# Patient Record
Sex: MALE | Race: WHITE | URBAN - METROPOLITAN AREA
[De-identification: names, ages, dates, MRNs, and addresses within clinical notes are randomized per-mention and may not be internally consistent; named-entity substitution may affect disease eponyms.]

---
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
## 2025-05-03 ENCOUNTER — OFFICE VISIT (OUTPATIENT)
Age: 20
End: 2025-05-03

## 2025-05-03 VITALS
HEART RATE: 69 BPM | RESPIRATION RATE: 18 BRPM | SYSTOLIC BLOOD PRESSURE: 97 MMHG | HEIGHT: 73 IN | BODY MASS INDEX: 21.47 KG/M2 | TEMPERATURE: 98.2 F | OXYGEN SATURATION: 100 % | WEIGHT: 162 LBS | DIASTOLIC BLOOD PRESSURE: 66 MMHG

## 2025-05-03 DIAGNOSIS — R00.1 SINUS BRADYCARDIA: Primary | ICD-10-CM

## 2025-05-03 ASSESSMENT — ENCOUNTER SYMPTOMS
RESPIRATORY NEGATIVE: 1
SORE THROAT: 0
EYES NEGATIVE: 1
ABDOMINAL PAIN: 0
RHINORRHEA: 0
ALLERGIC/IMMUNOLOGIC NEGATIVE: 1

## 2025-05-03 NOTE — PROGRESS NOTES
Fredo Weaver (: 2005) is a 19 y.o. male, New patient, here for evaluation of the following complaint(s): Chest Pain        History provided by:  Patient (Chest pressure after [employer] lasting a half hour. May have been holding breath at the end of PT work out.)   used: No    Chest Pain   This is a new problem. The current episode started today. The onset quality is sudden. The problem occurs rarely. The problem has been resolved. The pain is present in the substernal region. The patient is experiencing no pain (No pain at this time.). The pain does not radiate. Associated symptoms include dizziness and exertional chest pressure. Pertinent negatives include no abdominal pain, fever or headaches. Associated symptoms comments: Noticed after doing [employer].. He has tried rest for the symptoms. The treatment provided mild relief.        PAST MEDICAL HISTORY    Past Medical History:   Diagnosis Date    Asthma        SURGICAL HISTORY    No past surgical history on file.    CURRENT MEDICATIONS    Current Outpatient Rx   Medication Sig Dispense Refill    albuterol sulfate HFA (VENTOLIN HFA) 108 (90 BASE) MCG/ACT inhaler Inhale 2 puffs into the lungs every 6 hours as needed for Wheezing or Shortness of Breath 2 Inhaler 1       ALLERGIES    No Known Allergies    FAMILY HISTORY    No family history on file.    SOCIAL HISTORY    Social History     Socioeconomic History    Marital status: Single   Tobacco Use    Smoking status: Never    Smokeless tobacco: Never       Vitals:    25 1744   BP: 97/66   BP Site: Left Upper Arm   Patient Position: Sitting   BP Cuff Size: Medium Adult   Pulse: 69   Resp: 18   Temp: 98.2 °F (36.8 °C)   TempSrc: Oral   SpO2: 100%   Weight: 73.5 kg (162 lb)   Height: 1.854 m (6' 1\")       Review of Systems   Constitutional:  Negative for chills, fatigue and fever.   HENT:  Negative for congestion, ear pain, rhinorrhea and sore throat.    Eyes: Negative.    Respiratory: